# Patient Record
Sex: FEMALE | URBAN - NONMETROPOLITAN AREA
[De-identification: names, ages, dates, MRNs, and addresses within clinical notes are randomized per-mention and may not be internally consistent; named-entity substitution may affect disease eponyms.]

---

## 2021-10-19 ENCOUNTER — DOCTOR'S OFFICE (OUTPATIENT)
Dept: URBAN - NONMETROPOLITAN AREA CLINIC 1 | Facility: CLINIC | Age: 56
Setting detail: OPHTHALMOLOGY
End: 2021-10-19
Payer: COMMERCIAL

## 2021-10-19 DIAGNOSIS — H25.13: ICD-10-CM

## 2021-10-19 DIAGNOSIS — E11.9: ICD-10-CM

## 2021-10-19 PROCEDURE — 92004 COMPRE OPH EXAM NEW PT 1/>: CPT | Performed by: OPHTHALMOLOGY

## 2021-10-19 PROCEDURE — NO CHARGE N/C PROFESSIONAL COURTESY: Performed by: OPHTHALMOLOGY

## 2021-10-19 ASSESSMENT — CONFRONTATIONAL VISUAL FIELD TEST (CVF)
OD_FINDINGS: FULL
OS_FINDINGS: FULL

## 2021-10-21 ASSESSMENT — KERATOMETRY
OD_K2POWER_DIOPTERS: 42.50
OD_K1POWER_DIOPTERS: 41.75
OS_AXISANGLE_DEGREES: 083
OD_AXISANGLE_DEGREES: 089
OS_K2POWER_DIOPTERS: 42.00
OS_K1POWER_DIOPTERS: 41.75

## 2021-10-21 ASSESSMENT — SPHEQUIV_DERIVED
OS_SPHEQUIV: 0.5
OD_SPHEQUIV: 0

## 2021-10-21 ASSESSMENT — REFRACTION_AUTOREFRACTION
OS_CYLINDER: -0.50
OD_CYLINDER: -0.50
OD_AXIS: 159
OS_AXIS: 043
OD_SPHERE: +0.25
OS_SPHERE: +0.75

## 2021-10-21 ASSESSMENT — VISUAL ACUITY
OD_BCVA: 20/15-1
OS_BCVA: 20/20+2

## 2021-10-21 ASSESSMENT — AXIALLENGTH_DERIVED
OS_AL: 24.0009
OD_AL: 24.108

## 2021-11-08 ENCOUNTER — OPTICAL OFFICE (OUTPATIENT)
Dept: URBAN - NONMETROPOLITAN AREA CLINIC 4 | Facility: CLINIC | Age: 56
Setting detail: OPHTHALMOLOGY
End: 2021-11-08
Payer: COMMERCIAL

## 2021-11-08 ENCOUNTER — DOCTOR'S OFFICE (OUTPATIENT)
Dept: URBAN - NONMETROPOLITAN AREA CLINIC 1 | Facility: CLINIC | Age: 56
Setting detail: OPHTHALMOLOGY
End: 2021-11-08
Payer: COMMERCIAL

## 2021-11-08 DIAGNOSIS — H52.03: ICD-10-CM

## 2021-11-08 DIAGNOSIS — H52.4: ICD-10-CM

## 2021-11-08 PROBLEM — H25.13 CATARACT NUCLEAR SCLEROSIS; BOTH EYES: Status: ACTIVE | Noted: 2021-10-19

## 2021-11-08 PROBLEM — E11.9 DIABETES TYPE 2 NO RETINOPATHY: Status: ACTIVE | Noted: 2021-10-19

## 2021-11-08 PROCEDURE — V2202 LENS SPHERE BIFOCAL 7.12-20.: HCPCS | Performed by: OPTOMETRIST

## 2021-11-08 PROCEDURE — V2203 LENS SPHCYL BIFOCAL 4.00D/.1: HCPCS | Performed by: OPTOMETRIST

## 2021-11-08 PROCEDURE — 92015 DETERMINE REFRACTIVE STATE: CPT | Performed by: OPTOMETRIST

## 2021-11-08 PROCEDURE — V2784 LENS POLYCARB OR EQUAL: HCPCS | Performed by: OPTOMETRIST

## 2021-11-08 PROCEDURE — V2020 VISION SVCS FRAMES PURCHASES: HCPCS | Performed by: OPTOMETRIST

## 2021-11-08 PROCEDURE — V2760 SCRATCH RESISTANT COATING: HCPCS | Performed by: OPTOMETRIST

## 2021-11-08 ASSESSMENT — KERATOMETRY
OS_K2POWER_DIOPTERS: 42.00
OD_K1POWER_DIOPTERS: 41.75
OS_AXISANGLE_DEGREES: 083
OS_K1POWER_DIOPTERS: 41.75
OD_K2POWER_DIOPTERS: 42.50
OD_AXISANGLE_DEGREES: 089

## 2021-11-08 ASSESSMENT — AXIALLENGTH_DERIVED
OS_AL: 24.1022
OD_AL: 24.0067
OS_AL: 24.0009

## 2021-11-08 ASSESSMENT — REFRACTION_MANIFEST
OS_CYLINDER: -0.50
OS_ADD: +2.50
OS_VA1: 20/20
OD_VA2: 20/20
OS_SPHERE: +0.50
OS_AXIS: 060
OD_ADD: +2.50
OD_VA1: 20/20
OD_CYLINDER: SPH
OS_VA2: 20/20
OD_SPHERE: +0.25

## 2021-11-08 ASSESSMENT — SPHEQUIV_DERIVED
OS_SPHEQUIV: 0.5
OS_SPHEQUIV: 0.25
OD_SPHEQUIV: 0.25

## 2021-11-08 ASSESSMENT — REFRACTION_AUTOREFRACTION
OD_CYLINDER: 0.00
OS_AXIS: 060
OS_SPHERE: +0.75
OD_AXIS: 000
OD_SPHERE: +0.25
OS_CYLINDER: -0.50

## 2021-11-08 ASSESSMENT — VISUAL ACUITY
OD_BCVA: 20/20-1
OS_BCVA: 20/20

## 2023-01-16 ENCOUNTER — OFFICE VISIT (OUTPATIENT)
Dept: OBGYN CLINIC | Facility: CLINIC | Age: 58
End: 2023-01-16

## 2023-01-16 VITALS
HEIGHT: 68 IN | TEMPERATURE: 97.6 F | HEART RATE: 68 BPM | DIASTOLIC BLOOD PRESSURE: 80 MMHG | WEIGHT: 257 LBS | SYSTOLIC BLOOD PRESSURE: 126 MMHG | BODY MASS INDEX: 38.95 KG/M2

## 2023-01-16 DIAGNOSIS — G89.29 CHRONIC PAIN OF LEFT KNEE: Primary | ICD-10-CM

## 2023-01-16 DIAGNOSIS — M17.12 PRIMARY OSTEOARTHRITIS OF LEFT KNEE: ICD-10-CM

## 2023-01-16 DIAGNOSIS — M25.562 CHRONIC PAIN OF LEFT KNEE: Primary | ICD-10-CM

## 2023-01-16 DIAGNOSIS — E11.9 TYPE 2 DIABETES MELLITUS WITHOUT COMPLICATION, WITHOUT LONG-TERM CURRENT USE OF INSULIN (HCC): ICD-10-CM

## 2023-01-16 DIAGNOSIS — I10 PRIMARY HYPERTENSION: ICD-10-CM

## 2023-01-16 RX ORDER — ASCORBATE CALCIUM 500 MG
500 TABLET ORAL DAILY
COMMUNITY

## 2023-01-16 RX ORDER — BENAZEPRIL/HYDROCHLOROTHIAZIDE 20 MG-25MG
1 TABLET ORAL EVERY MORNING
COMMUNITY
Start: 2023-01-03

## 2023-01-16 RX ORDER — SIMETHICONE 80 MG
TABLET,CHEWABLE ORAL
COMMUNITY
Start: 2023-01-03

## 2023-01-16 RX ORDER — MELATONIN
1000 DAILY
COMMUNITY

## 2023-01-16 RX ORDER — ALBUTEROL SULFATE 90 UG/1
AEROSOL, METERED RESPIRATORY (INHALATION)
COMMUNITY

## 2023-01-16 RX ORDER — CITALOPRAM 40 MG/1
40 TABLET ORAL DAILY
COMMUNITY
Start: 2023-01-03

## 2023-01-16 RX ORDER — ACETAMINOPHEN 500 MG
500 TABLET ORAL AS NEEDED
COMMUNITY

## 2023-01-16 NOTE — LETTER
January 16, 2023     Damon Hawthorne, [address] [facility] [address] [facility] [address]    Patient: Eloina Maza   YOB: 1965   Date of Visit: 1/16/2023       Dear Dr Prabhu Oro: Thank you for referring Eloina Maza to me for evaluation  Below are my notes for this consultation  If you have questions, please do not hesitate to call me  I look forward to following your patient along with you  Sincerely,        Robles Knox        CC: No Recipients  Robles Knox  1/16/2023 10:03 AM  Signed  ASSESSMENT/PLAN:    Diagnoses and all orders for this visit:    Chronic pain of left knee  -     Ambulatory Referral to Physical Therapy; Future    Primary osteoarthritis of left knee  -     Ambulatory Referral to Physical Therapy; Future    Type 2 diabetes mellitus without complication, without long-term current use of insulin ([facility] [address] )    Primary hypertension      Plan: Treatment options were discussed  Treatment options included injection, physical therapy, anti-inflammatory medications and knee replacement arthroplasty  She would prefer to avoid knee replacement if possible  She does not want to pursue anti-inflammatory medications due to her allergy and did not want to do injection as she had a bad situation years ago when her knee was drained  She will give physical therapy a trial and a prescription was provided  I will see her in 4 to 6 weeks for reevaluation  She is to contact me if she has any questions or concerns  If she does wish to consider injection of hyaluronic acid, she is to contact the office so approval can be obtained prior to return  Return for 4 to 6 weeks  _____________________________________________________  CHIEF COMPLAINT:  Chief Complaint   Patient presents with   • Left Knee - Pain         SUBJECTIVE:  Eloina Maza is a 59y o  year old female who presents for evaluation of chronic left knee pain of approximately 4-year duration    She has had right knee replacement arthroplasty at [facility] about a year and a half ago and was seen at that facility in 2022  At that time, the operating surgeon told her there was a "long wait list" to proceed with surgery and she decided to seek care closer to home  She complains of medial sided knee pain which is activity related  She notes start up pain  She does have to limit her activity because of pain but is able to perform most activities of daily living  She denies radiation of pain distally but occasionally will experience some pain radiating proximally into the medial thigh  She denies lower extremity paresthesias  She denies having had any treatment, including injections, therapy or medications  PAST MEDICAL HISTORY:  Past Medical History:   Diagnosis Date   • Depression    • Diabetes mellitus ([facility] [address] )    • Hypertension        PAST SURGICAL HISTORY:  Past Surgical History:   Procedure Laterality Date   •  SECTION     • CHOLECYSTECTOMY     • HYSTERECTOMY     • KNEE SURGERY Right        FAMILY HISTORY:  History reviewed  No pertinent family history      SOCIAL HISTORY:  Social History     Tobacco Use   • Smoking status: Every Day     Packs/day: 0 25     Types: Cigarettes   • Smokeless tobacco: Never   Vaping Use   • Vaping Use: Never used   Substance Use Topics   • Alcohol use: Not Currently   • Drug use: Never       MEDICATIONS:    Current Outpatient Medications:   •  acetaminophen (TYLENOL) 500 mg tablet, Take 500 mg by mouth if needed for mild pain, Disp: , Rfl:   •  albuterol (PROVENTIL HFA,VENTOLIN HFA) 90 mcg/act inhaler, , Disp: , Rfl:   •  benazepril-hydrochlorthiazide (LOTENSIN HCT) 20-25 MG per tablet, Take 1 tablet by mouth every morning, Disp: , Rfl:   •  Calcium Ascorbate (VITAMIN C) 500 mg tablet, Take 500 mg by mouth daily, Disp: , Rfl:   •  cholecalciferol (VITAMIN D3) 1,000 units tablet, Take 1,000 Units by mouth daily, Disp: , Rfl:   •  citalopram (CeleXA) 40 mg tablet, Take 40 mg by mouth daily, Disp: , Rfl:   •  simethicone (MYLICON) 80 mg chewable tablet, CHEW 1 TAB IN THE MORNING, AT NOON, IN THE EVENING AND BEFORE BEDTIME AS NEEDED FOR GAS, Disp: , Rfl:     ALLERGIES:  Allergies   Allergen Reactions   • Aspirin Shortness Of Breath and Hives   • Penicillins Hives, Rash and Shortness Of Breath   • Sulfa Antibiotics Hives   • Ketorolac Tromethamine Other (See Comments)   • Cephalexin Hives and Rash   • Ziprasidone Anxiety and Hallucinations       Review of systems:   Constitutional: Negative for fatigue, fever or loss of apetite  HENT: Negative  Respiratory: Negative for shortness of breath, dyspnea  Cardiovascular: Negative for chest pain/tightness  Gastrointestinal: Negative for abdominal pain, N/V  Endocrine: Negative for cold/heat intolerance, unexplained weight loss/gain  Genitourinary: Negative for flank pain, dysuria, hematuria  Musculoskeletal: Positive as in HPI  Skin: Negative for rash  Neurological: Negative  Psychiatric/Behavioral: Negative for agitation  _____________________________________________________  PHYSICAL EXAMINATION:    Blood pressure 126/80, pulse 68, temperature 97 6 °F (36 4 °C), temperature source Temporal, height 5' 8" (1 727 m), weight 117 kg (257 lb)  General: well developed and well nourished, alert, oriented times 3 and appears comfortable  Psychiatric: Normal  HEENT: Benign  Cardiovascular: Regular    Pulmonary: No wheezing or stridor  Abdomen: Soft, Nontender  Skin: No masses, erythema, lacerations, fluctation, ulcerations  Neurovascular: Motor and sensory exams are intact and pulses palpable  MUSCULOSKELETAL EXAMINATION:    The left knee exam demonstrates full extension  She has flexion to about 120 degrees complaining of pain at endrange flexion  She denies pain with passive extension but does complain of pain with active tension  She has mild varus deformity  The skin is warm and dry  There is a small effusion noted  Varus can be corrected toward physiologic with valgus stress and she does complain of medial pain  She denies complaints with varus stress  Cruciate ligaments are grossly stable  Patellofemoral crepitus is noted  She does have tenderness along the medial knee including the femoral condyle, joint line and tibial plateau with no tenderness anteriorly or laterally  The remainder of the lower extremity exam is benign  The right knee demonstrates a well-healed incision consistent with her past surgical history, full extension and flexion to 120 degrees  Ligaments are stable       _____________________________________________________  STUDIES REVIEWED:  X-ray reviewed in the PACS system demonstrating advanced degenerative disease of the left knee with complete loss of medial joint space, large osteophytes subchondral sclerosis and cyst   There is lesser involvement of the lateral compartment with moderate involvement at the patellofemoral joint including osteophytes and narrowing  The report was reviewed in care everywhere          Guille Gallardo

## 2023-01-16 NOTE — PROGRESS NOTES
ASSESSMENT/PLAN:    Diagnoses and all orders for this visit:    Chronic pain of left knee  -     Ambulatory Referral to Physical Therapy; Future    Primary osteoarthritis of left knee  -     Ambulatory Referral to Physical Therapy; Future    Type 2 diabetes mellitus without complication, without long-term current use of insulin ([address] )    Primary hypertension      Plan: Treatment options were discussed  Treatment options included injection, physical therapy, anti-inflammatory medications and knee replacement arthroplasty  She would prefer to avoid knee replacement if possible  She does not want to pursue anti-inflammatory medications due to her allergy and did not want to do injection as she had a bad situation years ago when her knee was drained  She will give physical therapy a trial and a prescription was provided  I will see her in 4 to 6 weeks for reevaluation  She is to contact me if she has any questions or concerns  If she does wish to consider injection of hyaluronic acid, she is to contact the office so approval can be obtained prior to return  Return for 4 to 6 weeks  _____________________________________________________  CHIEF COMPLAINT:  Chief Complaint   Patient presents with   • Left Knee - Pain         SUBJECTIVE:  Victor Manuel Alcantara is a 59y o  year old female who presents for evaluation of chronic left knee pain of approximately 4-year duration  She has had right knee replacement arthroplasty at [facility] about a year and a half ago and was seen at that facility in September 2022  At that time, the operating surgeon told her there was a "long wait list" to proceed with surgery and she decided to seek care closer to home  She complains of medial sided knee pain which is activity related  She notes start up pain  She does have to limit her activity because of pain but is able to perform most activities of daily living    She denies radiation of pain distally but occasionally will experience some pain radiating proximally into the medial thigh  She denies lower extremity paresthesias  She denies having had any treatment, including injections, therapy or medications  PAST MEDICAL HISTORY:  Past Medical History:   Diagnosis Date   • Depression    • Diabetes mellitus ([facility] [address] )    • Hypertension        PAST SURGICAL HISTORY:  Past Surgical History:   Procedure Laterality Date   •  SECTION     • CHOLECYSTECTOMY     • HYSTERECTOMY     • KNEE SURGERY Right        FAMILY HISTORY:  History reviewed  No pertinent family history      SOCIAL HISTORY:  Social History     Tobacco Use   • Smoking status: Every Day     Packs/day: 0 25     Types: Cigarettes   • Smokeless tobacco: Never   Vaping Use   • Vaping Use: Never used   Substance Use Topics   • Alcohol use: Not Currently   • Drug use: Never       MEDICATIONS:    Current Outpatient Medications:   •  acetaminophen (TYLENOL) 500 mg tablet, Take 500 mg by mouth if needed for mild pain, Disp: , Rfl:   •  albuterol (PROVENTIL HFA,VENTOLIN HFA) 90 mcg/act inhaler, , Disp: , Rfl:   •  benazepril-hydrochlorthiazide (LOTENSIN HCT) 20-25 MG per tablet, Take 1 tablet by mouth every morning, Disp: , Rfl:   •  Calcium Ascorbate (VITAMIN C) 500 mg tablet, Take 500 mg by mouth daily, Disp: , Rfl:   •  cholecalciferol (VITAMIN D3) 1,000 units tablet, Take 1,000 Units by mouth daily, Disp: , Rfl:   •  citalopram (CeleXA) 40 mg tablet, Take 40 mg by mouth daily, Disp: , Rfl:   •  simethicone (MYLICON) 80 mg chewable tablet, CHEW 1 TAB IN THE MORNING, AT NOON, IN THE EVENING AND BEFORE BEDTIME AS NEEDED FOR GAS, Disp: , Rfl:     ALLERGIES:  Allergies   Allergen Reactions   • Aspirin Shortness Of Breath and Hives   • Penicillins Hives, Rash and Shortness Of Breath   • Sulfa Antibiotics Hives   • Ketorolac Tromethamine Other (See Comments)   • Cephalexin Hives and Rash   • Ziprasidone Anxiety and Hallucinations       Review of systems:   Constitutional: Negative for fatigue, fever or loss of apetite  HENT: Negative  Respiratory: Negative for shortness of breath, dyspnea  Cardiovascular: Negative for chest pain/tightness  Gastrointestinal: Negative for abdominal pain, N/V  Endocrine: Negative for cold/heat intolerance, unexplained weight loss/gain  Genitourinary: Negative for flank pain, dysuria, hematuria  Musculoskeletal: Positive as in HPI  Skin: Negative for rash  Neurological: Negative  Psychiatric/Behavioral: Negative for agitation  _____________________________________________________  PHYSICAL EXAMINATION:    Blood pressure 126/80, pulse 68, temperature 97 6 °F (36 4 °C), temperature source Temporal, height 5' 8" (1 727 m), weight 117 kg (257 lb)  General: well developed and well nourished, alert, oriented times 3 and appears comfortable  Psychiatric: Normal  HEENT: Benign  Cardiovascular: Regular    Pulmonary: No wheezing or stridor  Abdomen: Soft, Nontender  Skin: No masses, erythema, lacerations, fluctation, ulcerations  Neurovascular: Motor and sensory exams are intact and pulses palpable  MUSCULOSKELETAL EXAMINATION:    The left knee exam demonstrates full extension  She has flexion to about 120 degrees complaining of pain at endrange flexion  She denies pain with passive extension but does complain of pain with active tension  She has mild varus deformity  The skin is warm and dry  There is a small effusion noted  Varus can be corrected toward physiologic with valgus stress and she does complain of medial pain  She denies complaints with varus stress  Cruciate ligaments are grossly stable  Patellofemoral crepitus is noted  She does have tenderness along the medial knee including the femoral condyle, joint line and tibial plateau with no tenderness anteriorly or laterally  The remainder of the lower extremity exam is benign    The right knee demonstrates a well-healed incision consistent with her past surgical history, full extension and flexion to 120 degrees  Ligaments are stable       _____________________________________________________  STUDIES REVIEWED:  X-ray reviewed in the PACS system demonstrating advanced degenerative disease of the left knee with complete loss of medial joint space, large osteophytes subchondral sclerosis and cyst   There is lesser involvement of the lateral compartment with moderate involvement at the patellofemoral joint including osteophytes and narrowing  The report was reviewed in care everywhere          Dung Gardner

## 2023-02-13 ENCOUNTER — OFFICE VISIT (OUTPATIENT)
Dept: OBGYN CLINIC | Facility: CLINIC | Age: 58
End: 2023-02-13

## 2023-02-13 VITALS
DIASTOLIC BLOOD PRESSURE: 80 MMHG | WEIGHT: 257 LBS | BODY MASS INDEX: 38.95 KG/M2 | TEMPERATURE: 97 F | HEIGHT: 68 IN | SYSTOLIC BLOOD PRESSURE: 120 MMHG | HEART RATE: 69 BPM

## 2023-02-13 DIAGNOSIS — E11.9 TYPE 2 DIABETES MELLITUS WITHOUT COMPLICATION, WITHOUT LONG-TERM CURRENT USE OF INSULIN (HCC): ICD-10-CM

## 2023-02-13 DIAGNOSIS — M25.562 CHRONIC PAIN OF LEFT KNEE: ICD-10-CM

## 2023-02-13 DIAGNOSIS — G89.29 CHRONIC PAIN OF LEFT KNEE: ICD-10-CM

## 2023-02-13 DIAGNOSIS — Z72.0 TOBACCO USE: Primary | ICD-10-CM

## 2023-02-13 DIAGNOSIS — M17.12 PRIMARY OSTEOARTHRITIS OF LEFT KNEE: ICD-10-CM

## 2023-02-13 DIAGNOSIS — E66.09 CLASS 2 OBESITY DUE TO EXCESS CALORIES WITHOUT SERIOUS COMORBIDITY WITH BODY MASS INDEX (BMI) OF 39.0 TO 39.9 IN ADULT: ICD-10-CM

## 2023-02-13 PROBLEM — E66.812 CLASS 2 OBESITY DUE TO EXCESS CALORIES WITHOUT SERIOUS COMORBIDITY WITH BODY MASS INDEX (BMI) OF 39.0 TO 39.9 IN ADULT: Status: ACTIVE | Noted: 2023-02-13

## 2023-02-13 NOTE — PROGRESS NOTES
ASSESSMENT/PLAN:    Diagnoses and all orders for this visit:    Tobacco use  -     Nicotine, Blood; Future    Primary osteoarthritis of left knee    Chronic pain of left knee    Type 2 diabetes mellitus without complication, without long-term current use of insulin ([facility])    Class 2 obesity due to excess calories without serious comorbidity with body mass index (BMI) of 39 0 to 39 9 in adult        Plan: Treatment options were discussed  She does not want to proceed with injections at this time, indicating that when previously seen at [facility], she was told the arthritis was too advanced to consider any kind of treatment other than knee replacement  She has not seen any improvement with physical therapy and wants to proceed with knee replacement  However, she had previously quit smoking before proceeding with knee replacement for her right knee  Therefore, I will see her in 6 to 8 weeks  She is to quit smoking and a nicotine test was ordered for her to obtain just prior to follow-up in 6 to 8 weeks  I have encouraged her to contact me if questions or concerns arise in the interim  If she continues to want to proceed with knee replacement arthroplasty at follow-up, consent will be obtained, preoperative testing ordered and she will be scheduled for surgery  Repeat x-rays including weightbearing images of the left knee will be obtained at follow-up  Return 6 to 8 weeks  _____________________________________________________  CHIEF COMPLAINT:  Chief Complaint   Patient presents with   • Follow-up         SUBJECTIVE:  Manuel Del Cid is a 59y o  year old female who presents for follow up of her left knee  She has had 3 or 4 sessions of physical therapy and denies any improvement  She continues to complain of activity related knee pain, mild swelling and intolerance to activity  She notes start up pain  She does not believe that physical therapy has provided any benefit, even temporarily  PAST MEDICAL HISTORY:  Past Medical History:   Diagnosis Date   • Depression    • Diabetes mellitus ([facility] [address] )    • Hypertension        PAST SURGICAL HISTORY:  Past Surgical History:   Procedure Laterality Date   •  SECTION     • CHOLECYSTECTOMY     • HYSTERECTOMY     • KNEE SURGERY Right        FAMILY HISTORY:  History reviewed  No pertinent family history  SOCIAL HISTORY:  Social History     Tobacco Use   • Smoking status: Every Day     Packs/day: 0 25     Types: Cigarettes   • Smokeless tobacco: Never   Vaping Use   • Vaping Use: Never used   Substance Use Topics   • Alcohol use: Not Currently   • Drug use: Never       MEDICATIONS:    Current Outpatient Medications:   •  acetaminophen (TYLENOL) 500 mg tablet, Take 500 mg by mouth if needed for mild pain, Disp: , Rfl:   •  albuterol (PROVENTIL HFA,VENTOLIN HFA) 90 mcg/act inhaler, , Disp: , Rfl:   •  benazepril-hydrochlorthiazide (LOTENSIN HCT) 20-25 MG per tablet, Take 1 tablet by mouth every morning, Disp: , Rfl:   •  Calcium Ascorbate (VITAMIN C) 500 mg tablet, Take 500 mg by mouth daily, Disp: , Rfl:   •  cholecalciferol (VITAMIN D3) 1,000 units tablet, Take 1,000 Units by mouth daily, Disp: , Rfl:   •  citalopram (CeleXA) 40 mg tablet, Take 40 mg by mouth daily, Disp: , Rfl:   •  simethicone (MYLICON) 80 mg chewable tablet, CHEW 1 TAB IN THE MORNING, AT NOON, IN THE EVENING AND BEFORE BEDTIME AS NEEDED FOR GAS, Disp: , Rfl:     ALLERGIES:  Allergies   Allergen Reactions   • Aspirin Shortness Of Breath and Hives   • Penicillins Hives, Rash and Shortness Of Breath   • Sulfa Antibiotics Hives   • Ketorolac Tromethamine Other (See Comments)   • Cephalexin Hives and Rash   • Ziprasidone Anxiety and Hallucinations       REVIEW OF SYSTEMS:  Pertinent items are noted in HPI    A comprehensive review of systems was negative       _____________________________________________________  PHYSICAL EXAMINATION:  General: well developed and well nourished, alert, oriented times 3 and appears comfortable  Psychiatric: Normal  HEENT:  Normocephalic, atraumatic  Cardiovascular:  Regular  Pulmonary: No wheezing or stridor  Skin: No masses, erthema, lacerations, fluctation, ulcerations  Neurovascular: Motor and sensory exams are intact and pulses palpable  MUSCULOSKELETAL EXAMINATION:    The left knee exam demonstrates range of motion from 5 degrees to 100 degrees with complaints of pain at endrange flexion and extension  She has mild swelling  There is hypertrophy noted  Varus deformity is noted and valgus stress elicits complaints of mild medial pain and correction to neutral is noted  She denies any pain with varus stress  Cruciate ligaments are stable  She does complain of some tenderness medially but denies tenderness anteriorly or laterally  Crepitus is noted  _____________________________________________________  STUDIES REVIEWED:  X-rays in the PACS system demonstrate advanced degenerative disease              Guille Gallardo

## 2023-04-17 ENCOUNTER — DOCTOR'S OFFICE (OUTPATIENT)
Dept: URBAN - NONMETROPOLITAN AREA CLINIC 1 | Facility: CLINIC | Age: 58
Setting detail: OPHTHALMOLOGY
End: 2023-04-17
Payer: COMMERCIAL

## 2023-04-17 DIAGNOSIS — E11.9: ICD-10-CM

## 2023-04-17 DIAGNOSIS — H00.15: ICD-10-CM

## 2023-04-17 DIAGNOSIS — H35.373: ICD-10-CM

## 2023-04-17 DIAGNOSIS — H25.13: ICD-10-CM

## 2023-04-17 PROCEDURE — 92134 CPTRZ OPH DX IMG PST SGM RTA: CPT | Performed by: OPHTHALMOLOGY

## 2023-04-17 PROCEDURE — 99214 OFFICE O/P EST MOD 30 MIN: CPT | Performed by: OPHTHALMOLOGY

## 2023-04-17 ASSESSMENT — REFRACTION_AUTOREFRACTION
OS_CYLINDER: -0.50
OD_AXIS: 150
OD_CYLINDER: -0.25
OS_SPHERE: +1.25
OS_AXIS: 059
OD_SPHERE: +0.50

## 2023-04-17 ASSESSMENT — CONFRONTATIONAL VISUAL FIELD TEST (CVF)
OD_FINDINGS: FULL
OS_FINDINGS: FULL

## 2023-04-17 ASSESSMENT — AXIALLENGTH_DERIVED
OS_AL: 23.7544
OS_AL: 24.0544
OD_AL: 24.0038

## 2023-04-17 ASSESSMENT — KERATOMETRY
OD_K2POWER_DIOPTERS: 42.25
OS_K2POWER_DIOPTERS: 42.50
OD_K1POWER_DIOPTERS: 41.75
OS_AXISANGLE_DEGREES: 073
OS_K1POWER_DIOPTERS: 41.50
OD_AXISANGLE_DEGREES: 106

## 2023-04-17 ASSESSMENT — REFRACTION_MANIFEST
OS_VA2: 20/20
OD_CYLINDER: SPH
OD_VA2: 20/20
OS_CYLINDER: -0.50
OD_SPHERE: +0.25
OD_VA1: 20/20
OS_AXIS: 060
OS_ADD: +2.50
OS_SPHERE: +0.50
OS_VA1: 20/20
OD_ADD: +2.50

## 2023-04-17 ASSESSMENT — VISUAL ACUITY
OS_BCVA: 20/25-2
OD_BCVA: 20/20

## 2023-04-17 ASSESSMENT — SPHEQUIV_DERIVED
OD_SPHEQUIV: 0.375
OS_SPHEQUIV: 1
OS_SPHEQUIV: 0.25

## 2023-04-17 ASSESSMENT — LID EXAM ASSESSMENTS: OS_MEIBOMITIS: 1+

## 2024-02-07 ENCOUNTER — OPTICAL OFFICE (OUTPATIENT)
Dept: URBAN - NONMETROPOLITAN AREA CLINIC 4 | Facility: CLINIC | Age: 59
Setting detail: OPHTHALMOLOGY
End: 2024-02-07
Payer: COMMERCIAL

## 2024-02-07 ENCOUNTER — DOCTOR'S OFFICE (OUTPATIENT)
Dept: URBAN - NONMETROPOLITAN AREA CLINIC 1 | Facility: CLINIC | Age: 59
Setting detail: OPHTHALMOLOGY
End: 2024-02-07
Payer: COMMERCIAL

## 2024-02-07 DIAGNOSIS — H52.4: ICD-10-CM

## 2024-02-07 DIAGNOSIS — H52.03: ICD-10-CM

## 2024-02-07 PROCEDURE — V2203 LENS SPHCYL BIFOCAL 4.00D/.1: HCPCS | Mod: LT | Performed by: OPTOMETRIST

## 2024-02-07 PROCEDURE — V2784 LENS POLYCARB OR EQUAL: HCPCS | Mod: LT | Performed by: OPTOMETRIST

## 2024-02-07 PROCEDURE — 92012 INTRM OPH EXAM EST PATIENT: CPT | Performed by: OPTOMETRIST

## 2024-02-07 PROCEDURE — V2203 LENS SPHCYL BIFOCAL 4.00D/.1: HCPCS | Performed by: OPTOMETRIST

## 2024-02-07 PROCEDURE — V2020 VISION SVCS FRAMES PURCHASES: HCPCS | Performed by: OPTOMETRIST

## 2024-02-07 PROCEDURE — 92015 DETERMINE REFRACTIVE STATE: CPT | Performed by: OPTOMETRIST

## 2024-02-07 PROCEDURE — V2784 LENS POLYCARB OR EQUAL: HCPCS | Performed by: OPTOMETRIST

## 2024-02-07 ASSESSMENT — CONFRONTATIONAL VISUAL FIELD TEST (CVF)
OS_FINDINGS: FULL
OD_FINDINGS: FULL

## 2025-04-18 ENCOUNTER — HOSPITAL ENCOUNTER (OUTPATIENT)
Dept: PULMONOLOGY | Facility: HOSPITAL | Age: 60
End: 2025-04-18
Attending: FAMILY MEDICINE
Payer: COMMERCIAL

## 2025-04-18 DIAGNOSIS — R06.02 SOB (SHORTNESS OF BREATH): ICD-10-CM

## 2025-04-18 PROCEDURE — 94726 PLETHYSMOGRAPHY LUNG VOLUMES: CPT

## 2025-04-18 PROCEDURE — 94726 PLETHYSMOGRAPHY LUNG VOLUMES: CPT | Performed by: INTERNAL MEDICINE

## 2025-04-18 PROCEDURE — 94729 DIFFUSING CAPACITY: CPT

## 2025-04-18 PROCEDURE — 94060 EVALUATION OF WHEEZING: CPT

## 2025-04-18 PROCEDURE — 94760 N-INVAS EAR/PLS OXIMETRY 1: CPT

## 2025-04-18 PROCEDURE — 94729 DIFFUSING CAPACITY: CPT | Performed by: INTERNAL MEDICINE

## 2025-04-18 PROCEDURE — 94060 EVALUATION OF WHEEZING: CPT | Performed by: INTERNAL MEDICINE

## 2025-04-18 RX ORDER — ALBUTEROL SULFATE 0.83 MG/ML
2.5 SOLUTION RESPIRATORY (INHALATION) ONCE AS NEEDED
Status: COMPLETED | OUTPATIENT
Start: 2025-04-18 | End: 2025-04-18

## 2025-04-18 RX ADMIN — ALBUTEROL SULFATE 2.5 MG: 2.5 SOLUTION RESPIRATORY (INHALATION) at 09:21

## 2025-07-01 ENCOUNTER — OPTICAL OFFICE (OUTPATIENT)
Dept: URBAN - NONMETROPOLITAN AREA CLINIC 4 | Facility: CLINIC | Age: 60
Setting detail: OPHTHALMOLOGY
End: 2025-07-01
Payer: COMMERCIAL

## 2025-07-01 ENCOUNTER — DOCTOR'S OFFICE (OUTPATIENT)
Dept: URBAN - NONMETROPOLITAN AREA CLINIC 1 | Facility: CLINIC | Age: 60
Setting detail: OPHTHALMOLOGY
End: 2025-07-01
Payer: COMMERCIAL

## 2025-07-01 ENCOUNTER — RX ONLY (RX ONLY)
Age: 60
End: 2025-07-01

## 2025-07-01 DIAGNOSIS — H25.13: ICD-10-CM

## 2025-07-01 DIAGNOSIS — H52.03: ICD-10-CM

## 2025-07-01 DIAGNOSIS — E11.9: ICD-10-CM

## 2025-07-01 DIAGNOSIS — H35.373: ICD-10-CM

## 2025-07-01 DIAGNOSIS — H52.4: ICD-10-CM

## 2025-07-01 PROCEDURE — V2020 VISION SVCS FRAMES PURCHASES: HCPCS | Performed by: OPTOMETRIST

## 2025-07-01 PROCEDURE — 92015 DETERMINE REFRACTIVE STATE: CPT | Performed by: OPTOMETRIST

## 2025-07-01 PROCEDURE — V2784 LENS POLYCARB OR EQUAL: HCPCS | Performed by: OPTOMETRIST

## 2025-07-01 PROCEDURE — 92014 COMPRE OPH EXAM EST PT 1/>: CPT | Performed by: OPTOMETRIST

## 2025-07-01 PROCEDURE — V2203 LENS SPHCYL BIFOCAL 4.00D/.1: HCPCS | Performed by: OPTOMETRIST

## 2025-07-01 PROCEDURE — V2784 LENS POLYCARB OR EQUAL: HCPCS | Mod: LT | Performed by: OPTOMETRIST

## 2025-07-01 PROCEDURE — V2203 LENS SPHCYL BIFOCAL 4.00D/.1: HCPCS | Mod: LT | Performed by: OPTOMETRIST

## 2025-07-01 ASSESSMENT — REFRACTION_MANIFEST
OD_ADD: +2.50
OS_VA2: 20/20
OS_VA1: 20/20
OD_VA1: 20/20
OD_AXIS: 125
OD_CYLINDER: -0.50
OS_AXIS: 060
OS_SPHERE: +0.75
OD_VA2: 20/20
OS_ADD: +2.50
OD_SPHERE: +0.50
OS_CYLINDER: -0.75

## 2025-07-01 ASSESSMENT — REFRACTION_CURRENTRX
OS_CYLINDER: -0.50
OS_VPRISM_DIRECTION: BF
OD_CYLINDER: 0.00
OD_VPRISM_DIRECTION: BF
OS_AXIS: 067
OD_OVR_VA: 20/
OD_SPHERE: +0.25
OS_SPHERE: +0.50
OD_AXIS: 180
OS_ADD: +2.75
OD_ADD: +2.75
OS_OVR_VA: 20/

## 2025-07-01 ASSESSMENT — REFRACTION_AUTOREFRACTION
OD_AXIS: 121
OS_SPHERE: +1.00
OD_CYLINDER: -0.50
OS_CYLINDER: -0.75
OS_AXIS: 060
OD_SPHERE: +0.75

## 2025-07-01 ASSESSMENT — VISUAL ACUITY
OD_BCVA: 20/30+2
OS_BCVA: 20/30+2

## 2025-07-01 ASSESSMENT — KERATOMETRY
OS_AXISANGLE_DEGREES: 073
OD_AXISANGLE_DEGREES: 106
OD_K2POWER_DIOPTERS: 42.25
OD_K1POWER_DIOPTERS: 41.75
OS_K2POWER_DIOPTERS: 42.50
OS_K1POWER_DIOPTERS: 41.50

## 2025-07-01 ASSESSMENT — TONOMETRY
OD_IOP_MMHG: 16
OS_IOP_MMHG: 16

## 2025-07-01 ASSESSMENT — CONFRONTATIONAL VISUAL FIELD TEST (CVF)
OD_FINDINGS: FULL
OS_FINDINGS: FULL

## 2025-07-02 ENCOUNTER — OPTICAL OFFICE (OUTPATIENT)
Dept: URBAN - NONMETROPOLITAN AREA CLINIC 4 | Facility: CLINIC | Age: 60
Setting detail: OPHTHALMOLOGY
End: 2025-07-02
Payer: COMMERCIAL

## 2025-07-02 DIAGNOSIS — H52.03: ICD-10-CM

## 2025-07-02 PROCEDURE — V2020 VISION SVCS FRAMES PURCHASES: HCPCS | Performed by: OPTOMETRIST

## 2025-07-02 PROCEDURE — V2203 LENS SPHCYL BIFOCAL 4.00D/.1: HCPCS | Performed by: OPTOMETRIST

## 2025-07-02 PROCEDURE — V2203 LENS SPHCYL BIFOCAL 4.00D/.1: HCPCS | Mod: LT | Performed by: OPTOMETRIST

## 2025-07-02 PROCEDURE — V2784 LENS POLYCARB OR EQUAL: HCPCS | Mod: LT | Performed by: OPTOMETRIST

## 2025-07-02 PROCEDURE — V2784 LENS POLYCARB OR EQUAL: HCPCS | Performed by: OPTOMETRIST
